# Patient Record
Sex: FEMALE | Race: OTHER
[De-identification: names, ages, dates, MRNs, and addresses within clinical notes are randomized per-mention and may not be internally consistent; named-entity substitution may affect disease eponyms.]

---

## 2017-12-05 ENCOUNTER — HOSPITAL ENCOUNTER (EMERGENCY)
Dept: HOSPITAL 72 - EMR | Age: 67
Discharge: HOME | End: 2017-12-05
Payer: MEDICARE

## 2017-12-05 VITALS — SYSTOLIC BLOOD PRESSURE: 158 MMHG | DIASTOLIC BLOOD PRESSURE: 71 MMHG

## 2017-12-05 VITALS — BODY MASS INDEX: 31.25 KG/M2 | HEIGHT: 59 IN | WEIGHT: 155 LBS

## 2017-12-05 VITALS — DIASTOLIC BLOOD PRESSURE: 67 MMHG | SYSTOLIC BLOOD PRESSURE: 157 MMHG

## 2017-12-05 VITALS — SYSTOLIC BLOOD PRESSURE: 186 MMHG | DIASTOLIC BLOOD PRESSURE: 74 MMHG

## 2017-12-05 DIAGNOSIS — I10: Primary | ICD-10-CM

## 2017-12-05 DIAGNOSIS — J45.909: ICD-10-CM

## 2017-12-05 LAB
APPEARANCE UR: CLEAR
BACTERIA #/AREA URNS HPF: (no result) /HPF
KETONES UR QL STRIP: NEGATIVE
LEUKOCYTE ESTERASE UR QL STRIP: (no result)
NITRITE UR QL STRIP: NEGATIVE
PH UR STRIP: 7 [PH] (ref 4.5–8)
PROT UR QL STRIP: NEGATIVE
RBC #/AREA URNS HPF: (no result) /HPF (ref 0–2)
SP GR UR STRIP: 1.01 (ref 1–1.03)
SQUAMOUS #/AREA URNS LPF: (no result) /LPF
UROBILINOGEN UR-MCNC: NORMAL MG/DL (ref 0–1)
WBC #/AREA URNS HPF: (no result) /HPF (ref 0–2)

## 2017-12-05 PROCEDURE — 81001 URINALYSIS AUTO W/SCOPE: CPT

## 2017-12-05 PROCEDURE — 99283 EMERGENCY DEPT VISIT LOW MDM: CPT

## 2017-12-05 PROCEDURE — 93005 ELECTROCARDIOGRAM TRACING: CPT

## 2017-12-07 NOTE — CARDIOLOGY REPORT
--------------- APPROVED REPORT --------------





EKG Measurement

Heart [id number]

MT 

[id number]

[id number]

[id number]

[id number]





Normal sinus rhythm

Possible Anterior infarct, age undetermined

Abnormal ECG

## 2017-12-11 NOTE — EMERGENCY ROOM REPORT
History of Present Illness


General


Chief Complaint:  Hypertension


Source:  Patient





Present Illness


HPI


The patient is a 67-year-old female who presented after having increased 

generalized weakness.  Patient reportedly had been off of her blood pressure 

medications as well as her medications for liver disease.  Patient had not been 

vomiting.  She reports no recent trauma.  She had not been vomiting.  Patient 

denies any chest pain.


Allergies:  


Coded Allergies:  


     No Known Allergies (Unverified , 12/5/17)





Patient History


Last Menstrual Period:  na


Reviewed Nursing Documentation:  PMH: Agreed, PSxH: Agreed





Nursing Documentation-PMH


Past Medical History:  No History, Except For


Hx Hypertension:  Yes


Hx Asthma:  Yes





Review of Systems


All Other Systems:  negative except mentioned in HPI





Physical Exam





Vital Signs








  Date Time  Temp Pulse Resp B/P (MAP) Pulse Ox O2 Delivery O2 Flow Rate FiO2


 


12/5/17 09:55 98.1 69 18 182/93 99 Room Air  








Sp02 EP Interpretation:  reviewed, normal


General Appearance:  normal inspection, well appearing, no apparent distress, 

alert, GCS 15, non-toxic


Head:  atraumatic


ENT:  normal ENT inspection, hearing grossly normal, normal voice


Neck:  normal inspection, full range of motion, supple, no bony tend


Respiratory:  normal inspection, lungs clear, normal breath sounds, no 

respiratory distress, no retraction, no wheezing


Cardiovascular #1:  regular rate, rhythm, no edema


Gastrointestinal:  normal inspection, normal bowel sounds, non tender, soft, no 

guarding, no hernia


Genitourinary:  no CVA tenderness


Musculoskeletal:  normal inspection, back normal, normal range of motion


Neurologic:  normal inspection, alert, oriented x3, responsive, CNs III-XII nml 

as tested, speech normal


Psychiatric:  normal inspection, judgement/insight normal, mood/affect normal


Skin:  normal inspection, normal color, no rash





Medical Decision Making


Diagnostic Impression:  


 Primary Impression:  


 Hypertension


ER Course


Patient presented for hypertension.  The patient reports being out of blood 

pressure medications as well as Actigall.  Patient's benign exam and does not 

appear to require any further imaging or laboratory testing at this time. EKG 

interpreted by me showed normal sinus rhythm without acute ST or T wave 

changes.Medications were refilled.The patient is advised to follow up with  

primary care doctor in 1-2 days.  Patient is advised to return if any worsening 

condition or if any changes in status that are concerning.





This report is dictated with Dragon transcription software which may 

occasionally lead to discrepancies related to use of this software.





Labs








Test


  12/5/17


10:50


 


Urine Color Pale yellow 


 


Urine Appearance Clear 


 


Urine pH 7 (4.5-8.0) 


 


Urine Specific Gravity


  1.010


(1.005-1.035)


 


Urine Protein


  Negative


(NEGATIVE)


 


Urine Glucose (UA)


  Negative


(NEGATIVE)


 


Urine Ketones


  Negative


(NEGATIVE)


 


Urine Occult Blood 1+ (NEGATIVE) 


 


Urine Nitrite


  Negative


(NEGATIVE)


 


Urine Bilirubin


  Negative


(NEGATIVE)


 


Urine Urobilinogen


  Normal MG/DL


(0.0-1.0)


 


Urine Leukocyte Esterase 2+ (NEGATIVE) 


 


Urine RBC


  0-2 /HPF (0 -


2)


 


Urine WBC


  2-4 /HPF (0 -


2)


 


Urine Squamous Epithelial


Cells Few /LPF


(NONE/OCC)


 


Urine Bacteria


  None /HPF


(NONE)











Last Vital Signs








  Date Time  Temp Pulse Resp B/P (MAP) Pulse Ox O2 Delivery O2 Flow Rate FiO2


 


12/5/17 11:35  75 12 158/71 99 Room Air  


 


12/5/17 10:15 98.2       








Status:  improved


Disposition:  HOME, SELF-CARE


Condition:  Stable


Scripts


Ursodiol (URSODIOL*) 300 Mg Capsule


300 MG ORAL TWICE A DAY, #30 CAP 0 Refills


   Prov: Franklyn Ramachandran         12/5/17 


Hydrochlorothiazide* (HYDROCHLOROTHIAZIDE*) 25 Mg Tablet


25 MG ORAL DAILY, #30 TAB


   Prov: Franklyn Ramachandran         12/5/17


Referrals:  


NOT CHOSEN [facility]/MD,REFERRING (PCP)


Patient Instructions:  Hypertension











Franklyn Ramachandran Dec 11, 2017 10:22

## 2018-02-16 ENCOUNTER — HOSPITAL ENCOUNTER (EMERGENCY)
Dept: HOSPITAL 72 - EMR | Age: 68
Discharge: HOME | End: 2018-02-16
Payer: COMMERCIAL

## 2018-02-16 VITALS — HEIGHT: 59 IN | WEIGHT: 130 LBS | BODY MASS INDEX: 26.21 KG/M2

## 2018-02-16 VITALS — SYSTOLIC BLOOD PRESSURE: 138 MMHG | DIASTOLIC BLOOD PRESSURE: 64 MMHG

## 2018-02-16 VITALS — DIASTOLIC BLOOD PRESSURE: 64 MMHG | SYSTOLIC BLOOD PRESSURE: 138 MMHG

## 2018-02-16 VITALS — SYSTOLIC BLOOD PRESSURE: 153 MMHG | DIASTOLIC BLOOD PRESSURE: 67 MMHG

## 2018-02-16 DIAGNOSIS — J32.2: Primary | ICD-10-CM

## 2018-02-16 DIAGNOSIS — I10: ICD-10-CM

## 2018-02-16 DIAGNOSIS — J32.3: ICD-10-CM

## 2018-02-16 DIAGNOSIS — N39.0: ICD-10-CM

## 2018-02-16 LAB
ADD MANUAL DIFF: NO
ALBUMIN SERPL-MCNC: 3.1 G/DL (ref 3.4–5)
ALBUMIN/GLOB SERPL: 0.6 {RATIO} (ref 1–2.7)
ALP SERPL-CCNC: 419 U/L (ref 46–116)
ALT SERPL-CCNC: 70 U/L (ref 12–78)
ANION GAP SERPL CALC-SCNC: 3 MMOL/L (ref 5–15)
APPEARANCE UR: CLEAR
APTT PPP: (no result) S
AST SERPL-CCNC: 62 U/L (ref 15–37)
BASOPHILS NFR BLD AUTO: 1.3 % (ref 0–2)
BILIRUB SERPL-MCNC: 0.5 MG/DL (ref 0.2–1)
BUN SERPL-MCNC: 8 MG/DL (ref 7–18)
CALCIUM SERPL-MCNC: 9.1 MG/DL (ref 8.5–10.1)
CHLORIDE SERPL-SCNC: 102 MMOL/L (ref 98–107)
CO2 SERPL-SCNC: 32 MMOL/L (ref 21–32)
CREAT SERPL-MCNC: 0.6 MG/DL (ref 0.55–1.3)
EOSINOPHIL NFR BLD AUTO: 1.6 % (ref 0–3)
ERYTHROCYTE [DISTWIDTH] IN BLOOD BY AUTOMATED COUNT: 12.3 % (ref 11.6–14.8)
GLOBULIN SER-MCNC: 5.2 G/DL
GLUCOSE UR STRIP-MCNC: NEGATIVE MG/DL
HCT VFR BLD CALC: 44.9 % (ref 37–47)
HGB BLD-MCNC: 15.3 G/DL (ref 12–16)
KETONES UR QL STRIP: NEGATIVE
LEUKOCYTE ESTERASE UR QL STRIP: (no result)
LYMPHOCYTES NFR BLD AUTO: 31.7 % (ref 20–45)
MCV RBC AUTO: 91 FL (ref 80–99)
MONOCYTES NFR BLD AUTO: 11.7 % (ref 1–10)
NEUTROPHILS NFR BLD AUTO: 53.8 % (ref 45–75)
NITRITE UR QL STRIP: NEGATIVE
PH UR STRIP: 7 [PH] (ref 4.5–8)
PLATELET # BLD: 212 K/UL (ref 150–450)
POTASSIUM SERPL-SCNC: 3.9 MMOL/L (ref 3.5–5.1)
PROT UR QL STRIP: NEGATIVE
RBC # BLD AUTO: 4.93 M/UL (ref 4.2–5.4)
SODIUM SERPL-SCNC: 136 MMOL/L (ref 136–145)
SP GR UR STRIP: 1 (ref 1–1.03)
UROBILINOGEN UR-MCNC: NORMAL MG/DL (ref 0–1)
WBC # BLD AUTO: 8.6 K/UL (ref 4.8–10.8)

## 2018-02-16 PROCEDURE — 36415 COLL VENOUS BLD VENIPUNCTURE: CPT

## 2018-02-16 PROCEDURE — 81001 URINALYSIS AUTO W/SCOPE: CPT

## 2018-02-16 PROCEDURE — 70450 CT HEAD/BRAIN W/O DYE: CPT

## 2018-02-16 PROCEDURE — 99284 EMERGENCY DEPT VISIT MOD MDM: CPT

## 2018-02-16 PROCEDURE — 84484 ASSAY OF TROPONIN QUANT: CPT

## 2018-02-16 PROCEDURE — 85025 COMPLETE CBC W/AUTO DIFF WBC: CPT

## 2018-02-16 PROCEDURE — 80053 COMPREHEN METABOLIC PANEL: CPT

## 2018-02-16 NOTE — DIAGNOSTIC IMAGING REPORT
Indication: Headaches

 

Technique: Continuous helical CT scanning of the head was performed without

intravenous contrast material. Axial and coronal 5 mm sections were generated.

Radiation dose was minimized using automated exposure control

 

Dose:

Total Dose Length Product - DLP 1305.71 mGycm.

Volume CT Dose Index - CTDIvol(s) 70.38 mGy.

 

Comparison: none

 

Findings: The ventricular system is normal in size and configuration. There is no

shift of midline structures. No abnormal extra-axial fluid collections are noted.

There is no evidence of intracerebral bleeding. No other abnormal high or low density

areas are noted within the brain.  There is ethmoid and sphenoid sinus disease. The

mastoids are underpneumatized on the right, otherwise clear. Intact calvarium. The

included orbits are unremarkable

 

Impression: Normal CT scan of the head without contrast material.

 

Incidental finding of sinus disease

 

 

The CT scanner at [facility] is accredited by the [employer]

[employer]iology and the scans are performed using protocols designed to limit radiation

exposure to as low as reasonably achievable to attain images of sufficient resolution

adequate for diagnostic evaluation.

## 2018-02-17 NOTE — EMERGENCY ROOM REPORT
History of Present Illness


General


Chief Complaint:  Hypertension


Source:  Patient, Medical Record





Present Illness


HPI


Patient is a 67 year-old female sent in by PMD for elevated blood pressure.  

Patient had been previously taking clonidine but had run out of medication.  

Patient reported having some headache. gradual onset of throbbing sensation.  

She denies visual changes or chest or back pain.  She had not been vomiting.  

She was given clonidine at her doctors office.


Allergies:  


Coded Allergies:  


     No Known Allergies (Unverified , 12/5/17)





Patient History


Past Medical History:  see triage record


Reviewed Nursing Documentation:  PMH: Agreed, PSxH: Agreed





Nursing Documentation-PM


Past Medical History:  No History, Except For


Hx Hypertension:  Yes


Hx Asthma:  Yes





Review of Systems


All Other Systems:  negative except mentioned in HPI





Physical Exam





Vital Signs








  Date Time  Temp Pulse Resp B/P (MAP) Pulse Ox O2 Delivery O2 Flow Rate FiO2


 


2/16/18 11:21 97.7 63 18 148/72 95 Room Air  





 97.7       








Sp02 EP Interpretation:  reviewed, normal


General Appearance:  normal inspection, well appearing, no apparent distress, 

alert, GCS 15


Head:  atraumatic


ENT:  normal ENT inspection, hearing grossly normal, normal voice


Neck:  normal inspection, full range of motion, supple, no bony tend


Respiratory:  normal inspection, lungs clear, normal breath sounds, no 

respiratory distress, no retraction, no wheezing


Cardiovascular #1:  regular rate, rhythm, no edema


Gastrointestinal:  normal inspection, normal bowel sounds, non tender, soft, no 

guarding, no hernia


Genitourinary:  no CVA tenderness


Musculoskeletal:  normal inspection, back normal, normal range of motion


Neurologic:  normal inspection, alert, oriented x3, responsive, CNs III-XII nml 

as tested, motor strength/tone normal, speech normal


Psychiatric:  normal inspection, judgement/insight normal, mood/affect normal


Skin:  normal inspection, normal color, no rash





Medical Decision Making


Diagnostic Impression:  


 Primary Impression:  


 Sinusitis


 Additional Impressions:  


 UTI (urinary tract infection)


 Hypertension


ER Course


Patient presented for headache.  Differential diagnosis included but was not 

limited to hypertensive crisis, medication withdrawal, urinary tract infection, 

meningitis among others.  Because of patient complexity imaging studies and 

laboratory testing was ordered.  Patient was noted to have evidence of UTI, CT 

of head read by radiology showed sinus disease without evidence of CVA or 

hemorhage.  Patient was given antibiotics for UTI.  Patient was offered 

admission but stated she wanted to go home.  Patient was given prescription for 

blood pressure medication and antibiotics.  She was advised to return for any 

concerns and to follow up with her primary care physician for recheck in the 

next 2-3 days.





Labs








Test


  2/16/18


11:45


 


White Blood Count


  8.6 K/UL


(4.8-10.8)


 


Red Blood Count


  4.93 M/UL


(4.20-5.40)


 


Hemoglobin


  15.3 G/DL


(12.0-16.0)


 


Hematocrit


  44.9 %


(37.0-47.0)


 


Mean Corpuscular Volume 91 FL (80-99) 


 


Mean Corpuscular Hemoglobin


  31.0 PG


(27.0-31.0)


 


Mean Corpuscular Hemoglobin


Concent 34.1 G/DL


(32.0-36.0)


 


Red Cell Distribution Width


  12.3 %


(11.6-14.8)


 


Platelet Count


  212 K/UL


(150-450)


 


Mean Platelet Volume


  9.1 FL


(6.5-10.1)


 


Neutrophils (%) (Auto)


  53.8 %


(45.0-75.0)


 


Lymphocytes (%) (Auto)


  31.7 %


(20.0-45.0)


 


Monocytes (%) (Auto)


  11.7 %


(1.0-10.0)


 


Eosinophils (%) (Auto)


  1.6 %


(0.0-3.0)


 


Basophils (%) (Auto)


  1.3 %


(0.0-2.0)


 


Urine Color Pale yellow 


 


Urine Appearance Clear 


 


Urine pH 7 (4.5-8.0) 


 


Urine Specific Gravity


  1.005


(1.005-1.035)


 


Urine Protein


  Negative


(NEGATIVE)


 


Urine Glucose (UA)


  Negative


(NEGATIVE)


 


Urine Ketones


  Negative


(NEGATIVE)


 


Urine Occult Blood


  Negative


(NEGATIVE)


 


Urine Nitrite


  Negative


(NEGATIVE)


 


Urine Bilirubin


  Negative


(NEGATIVE)


 


Urine Urobilinogen


  Normal MG/DL


(0.0-1.0)


 


Urine Leukocyte Esterase 2+ (NEGATIVE) 


 


Urine RBC


  0-2 /HPF (0 -


2)


 


Urine WBC


  5-10 /HPF (0 -


2)


 


Urine Squamous Epithelial


Cells Few /LPF


(NONE/OCC)


 


Urine Bacteria


  Occasional


/HPF (NONE)


 


Sodium Level


  136 MMOL/L


(136-145)


 


Potassium Level


  3.9 MMOL/L


(3.5-5.1)


 


Chloride Level


  102 MMOL/L


()


 


Carbon Dioxide Level


  32 MMOL/L


(21-32)


 


Anion Gap


  3 mmol/L


(5-15)


 


Blood Urea Nitrogen 8 mg/dL (7-18) 


 


Creatinine


  0.6 MG/DL


(0.55-1.30)


 


Estimat Glomerular Filtration


Rate > 60 mL/min


(>60)


 


Glucose Level


  111 MG/DL


()


 


Calcium Level


  9.1 MG/DL


(8.5-10.1)


 


Total Bilirubin


  0.5 MG/DL


(0.2-1.0)


 


Aspartate Amino Transf


(AST/SGOT) 62 U/L (15-37) 


 


 


Alanine Aminotransferase


(ALT/SGPT) 70 U/L (12-78) 


 


 


Alkaline Phosphatase


  419 U/L


()


 


Troponin I


  0.013 ng/mL


(0.000-0.056)


 


Total Protein


  8.3 G/DL


(6.4-8.2)


 


Albumin


  3.1 G/DL


(3.4-5.0)


 


Globulin 5.2 g/dL 


 


Albumin/Globulin Ratio 0.6 (1.0-2.7) 











Last Vital Signs








  Date Time  Temp Pulse Resp B/P (MAP) Pulse Ox O2 Delivery O2 Flow Rate FiO2


 


2/16/18 13:05 97.6       


 


2/16/18 13:04  60 16 138/64 99 Room Air  








Status:  improved


Disposition:  HOME, SELF-CARE


Condition:  Improved


Scripts


Cephalexin* (KEFLEX*) 500 Mg Capsule


500 MG ORAL Q6H, #28 CAP 0 Refills


   Prov: Franklyn Ramachandran         2/16/18 


Hydrochlorothiazide* (HYDROCHLOROTHIAZIDE*) 25 Mg Tablet


25 MG ORAL DAILY, #30 TAB


   Prov: Franklyn Ramachandran         2/16/18 


Clonidine Hcl* (CATAPRES*) 0.1 Mg Tablet


0.1 MG ORAL EVERY 8 HOURS, #14 TAB


   Prov: Franklyn Ramachandran         2/16/18


Patient Instructions:  Urinary Tract Infection











Franklyn Ramachandran Feb 17, 2018 19:08

## 2018-03-05 ENCOUNTER — HOSPITAL ENCOUNTER (EMERGENCY)
Dept: HOSPITAL 72 - EMR | Age: 68
Discharge: HOME | End: 2018-03-05
Payer: MEDICARE

## 2018-03-05 VITALS — DIASTOLIC BLOOD PRESSURE: 60 MMHG | SYSTOLIC BLOOD PRESSURE: 156 MMHG

## 2018-03-05 VITALS — WEIGHT: 150 LBS | BODY MASS INDEX: 29.45 KG/M2 | HEIGHT: 60 IN

## 2018-03-05 DIAGNOSIS — J45.909: ICD-10-CM

## 2018-03-05 DIAGNOSIS — I10: ICD-10-CM

## 2018-03-05 DIAGNOSIS — N39.0: Primary | ICD-10-CM

## 2018-03-05 DIAGNOSIS — B34.9: ICD-10-CM

## 2018-03-05 DIAGNOSIS — J06.9: ICD-10-CM

## 2018-03-05 LAB
APPEARANCE UR: (no result)
APTT PPP: YELLOW S
GLUCOSE UR STRIP-MCNC: NEGATIVE MG/DL
KETONES UR QL STRIP: NEGATIVE
LEUKOCYTE ESTERASE UR QL STRIP: (no result)
NITRITE UR QL STRIP: NEGATIVE
PH UR STRIP: 8 [PH] (ref 4.5–8)
PROT UR QL STRIP: NEGATIVE
SP GR UR STRIP: 1.01 (ref 1–1.03)
UROBILINOGEN UR-MCNC: 1 MG/DL (ref 0–1)

## 2018-03-05 PROCEDURE — 81003 URINALYSIS AUTO W/O SCOPE: CPT

## 2018-03-05 PROCEDURE — 86710 INFLUENZA VIRUS ANTIBODY: CPT

## 2018-03-05 PROCEDURE — 99282 EMERGENCY DEPT VISIT SF MDM: CPT

## 2018-03-05 NOTE — EMERGENCY ROOM REPORT
History of Present Illness


General


Chief Complaint:  Upper Respiratory Illness


Source:  Patient





Present Illness


HPI


Patient is a 67-year-old female who presented after increased cough and sore 

throat.  The patient gradual onset of symptoms.  This was associated with some 

headache.  The patient reported having prior history of hypertension.  She 

reports taking inhalers.  She had a fever for several days.  She reportedly 

been sick for 4 days.


Allergies:  


Coded Allergies:  


     No Known Allergies (Unverified , 12/5/17)





Patient History


Past Medical History:  see triage record


Reviewed Nursing Documentation:  PMH: Agreed, PSxH: Agreed





Nursing Documentation-PMH


Past Medical History:  No History, Except For


Hx Hypertension:  Yes


Hx Asthma:  Yes





Review of Systems


All Other Systems:  negative except mentioned in HPI





Physical Exam





Vital Signs








  Date Time  Temp Pulse Resp B/P (MAP) Pulse Ox O2 Delivery O2 Flow Rate FiO2


 


3/5/18 09:21 98.8 86 18 170/71 95 Room Air  





 98.8       








General Appearance:  well appearing, no apparent distress, alert, GCS 15, non-

toxic


Head:  normocephalic, atraumatic


ENT:  hearing grossly normal, normal voice


Neck:  full range of motion, supple


Respiratory:  no respiratory distress, speaking full sentences


Cardiovascular #1:  normal inspection, normal peripheral pulses, regular rate, 

rhythm


Gastrointestinal:  normal inspection


Genitourinary:  no CVA tenderness


Musculoskeletal:  no calf tenderness


Neurologic:  normal inspection, alert, oriented x3, responsive, normal gait


Psychiatric:  normal inspection, mood/affect normal


Skin:  no rash





Medical Decision Making


Diagnostic Impression:  


 Primary Impression:  


 UTI (urinary tract infection)


 Additional Impression:  


 Viral respiratory infection


ER Course


Patient presented for cough.  Differential diagnosis included but was not 

limited to meningitis, exudative tonsillitis, retropharyngeal abscess, 

epiglottitis, strep pharyngitis.Urinalysis showed evidence of urinary 

infection.  Patient was given prescription for oral antibiotics.The patient is 

advised to follow up with  primary care doctor in 1-2 days.  Patient is advised 

to return if any worsening condition or if any changes in status that are 

concerning.





This report is dictated with Dragon transcription software which may 

occasionally lead to discrepancies related to use of this software.





Labs








Test


  3/5/18


10:00


 


Urine Color Yellow 


 


Urine Appearance


  Slightly


cloudy


 


Urine pH 8 (4.5-8.0) 


 


Urine Specific Gravity


  1.010


(1.005-1.035)


 


Urine Protein


  Negative


(NEGATIVE)


 


Urine Glucose (UA)


  Negative


(NEGATIVE)


 


Urine Ketones


  Negative


(NEGATIVE)


 


Urine Occult Blood 1+ (NEGATIVE) 


 


Urine Nitrite


  Negative


(NEGATIVE)


 


Urine Bilirubin


  Negative


(NEGATIVE)


 


Urine Urobilinogen


  1 MG/DL


(0.0-1.0)


 


Urine Leukocyte Esterase 2+ (NEGATIVE) 


 


Urine RBC


  0-2 /HPF (0 -


2)


 


Urine WBC


  2-4 /HPF (0 -


2)


 


Urine Squamous Epithelial


Cells Few /LPF


(NONE/OCC)


 


Urine Bacteria


  Occasional


/HPF (NONE)











Last Vital Signs








  Date Time  Temp Pulse Resp B/P (MAP) Pulse Ox O2 Delivery O2 Flow Rate FiO2


 


3/5/18 09:31  86 18   Room Air  


 


3/5/18 09:21 98.8   170/71 95   





 98.8       








Status:  improved


Disposition:  HOME, SELF-CARE


Condition:  Stable


Scripts


Albuterol Sulfate* (ALBUTEROL SULFATE MDI*) 8.5 Gm Hfa.aer.ad


2 PUFF INH Q6H, #1 INH 0 Refills


   Prov: Franklyn Ramachandran         3/5/18 


Trimethoprim/Sulfamethoxazole 160/800* (BACTRIM DS TABLET*) 1 Each Tablet


1 TAB ORAL Q12H, #14 TAB 0 Refills


   Prov: Franklyn Ramachandran         3/5/18


Referrals:  


REBEKAH TELLEZ (PCP)











Franklyn Ramachandran Mar 5, 2018 09:55